# Patient Record
(demographics unavailable — no encounter records)

---

## 2025-05-14 NOTE — HISTORY OF PRESENT ILLNESS
[Premenarchal] : premenarchal [FreeTextEntry2] : Jil  is a 12-year 3-month-old who is referred for evaluation of her growth.  According to mom concern was raised a couple of years ago.  Mom has noted that kam looks much smaller than her peers in school.  Mom reports that Jil  has an older sister who is also small at slightly below 5 feet.  Mom does remember that Jil  had a bone age performed over a year ago but she does not remember the results.  Review of Katelin's growth curve indicates that she was growing along the lower end of the curve up until approximately 25 months of age at which time height fell below the third centile.  Review of her later growth curves indicate weight at or below the third centile and linear growth below the third centile although growth rate appears  stable.  Jil has  been in good health

## 2025-05-14 NOTE — PHYSICAL EXAM
[Healthy Appearing] : healthy appearing [Well Nourished] : well nourished [Interactive] : interactive [Normal Appearance] : normal appearance [Well formed] : well formed [Normally Set] : normally set [Normal S1 and S2] : normal S1 and S2 [Clear to Ausculation Bilaterally] : clear to auscultation bilaterally [Abdomen Soft] : soft [Abdomen Tenderness] : non-tender [] : no hepatosplenomegaly [Normal] : normal  [Murmur] : no murmurs [Philippe Stage ___] : the Philippe stage for breast development was [unfilled]

## 2025-05-14 NOTE — PAST MEDICAL HISTORY
[At Term] : at term [Normal Vaginal Route] : by normal vaginal route [None] : there were no delivery complications [Age Appropriate] : age appropriate developmental milestones met [de-identified] : under 6 lbs

## 2025-05-14 NOTE — FAMILY HISTORY
[___ inches] : [unfilled] inches [de-identified] : tall [FreeTextEntry5] : 12 [FreeTextEntry2] : sister 59"